# Patient Record
(demographics unavailable — no encounter records)

---

## 2017-01-06 NOTE — PDOC
VAGINAL DELIVERY


DATE


DATE: 17 


TIME: 14:24


:  4


Para:  4


EGA:  40


VAGINAL DELIVERY:  VTX


VACCUM ASSISTED:  No


PLACENTA:  Spontaneous


APGAR


8/9


SEX:  Male


WEIGHT


Weight [3455 gm ]


Nuchal Cord:  Yes, Times 2


Amniotic Fluid:  Clear


PAIN:  Natural


EPISIOTOMY:  No


EXTENSION:  Yes (2nd degree midline laceration)


REPAIRED WITH


2-0 vicryl


EBL


300 ml


COMPLICATIONS


none


CONDITION


pt. stable


Signs of Intrauterine Infectio:  None


Shoulder Dystocia:  No


Problems:  








JESSE LAUGHLIN Jr, MD 2017 14:25

## 2017-01-06 NOTE — PDOC1
OB - History


Hx of Present Pregnancy


Prenatal Care:  Good Care


Ultrasounds:  Normal mid trimester US


Obstetrical Complications:  None


Medical Complications:  None





Past Family/Social History


*


Past Medical, Surgical, Family and Obstetric Histories reviewed from prenatal 

chart.


Rubella:  Immune


RPR/VDRL:  Negative


GBS Status:  Negative


HBsAG:  Negative





OB - Chief Complaint & HPI


Date of Admission:


Date of Admission:  2017 at 09:23


Chief Complaint/History


:  4


Para:  3


EGA:  38


Reason for admission:  active labor


Admission Nurse Assessment Rev:  Yes


Problems:  





OB - Admission Exam


Physical Exam


Vitals:





 VS - Last 72 Hours, by Label








  Date Time  Temp Pulse Resp B/P Pulse Ox O2 Delivery O2 Flow Rate FiO2


 


17 11:50   22   Room Air  


 


17 10:24 97.9 98 20 124/67    





 97.9       








HEENT:  Normal


Heart:  Regular Rate


Lungs:  Clear


Abdomen:  Gravid, Non tender, Soft


Extremities:  Edema


Reflexes:  Normal


Cervical Dilatation:  5cm


Effacement:  75%


Station:  -2


Membranes:  Intact


Fetal Heart Rate:  Normal


Accelerations:  Accelerations Present


Decelerations:  No decelerations


Contractions on Admission:  < 5 Minutes Apart


Intensity:  Moderate


Text


A: 38 wks IUP


    Active labor


P: Admit for labor management.








JESSE LAUGHLIN Jr, MD 2017 13:38

## 2017-01-07 NOTE — PDOC
OB Progress Note


Date of Service


17


Time of Evaluation


1230


Notes


Pt. feeling well.  No complaints.


Lab





Laboratory Tests








Test


  17


09:50 17


04:00


 


White Blood Count


  10.9x10^3/uL


(4.0-11.0) 10.8x10^3/uL


(4.0-11.0)


 


Red Blood Count


  4.51x10^6/uL


(3.50-5.40) 3.94x10^6/uL


(3.50-5.40)


 


Hemoglobin


  13.4g/dL


(12.0-15.5) 11.7g/dL


(12.0-15.5)


 


Hematocrit


  39.4%


(36.0-47.0) 35.4%


(36.0-47.0)


 


Mean Corpuscular Volume 87fL ()  90fL () 


 


Mean Corpuscular Hemoglobin 30pg (25-35)  30pg (25-35) 


 


Mean Corpuscular Hemoglobin


Concent 34g/dL (31-37) 


  33g/dL (31-37) 


 


 


Red Cell Distribution Width


  13.4%


(11.5-14.5) 13.2%


(11.5-14.5)


 


Platelet Count


  237x10^3/uL


(140-400) 202x10^3/uL


(140-400)


 


Neutrophils (%) (Auto)  70% (31-73) 


 


Lymphocytes (%) (Auto)  23% (24-48) 


 


Monocytes (%) (Auto)  6% (0-9) 


 


Eosinophils (%) (Auto)  0% (0-3) 


 


Basophils (%) (Auto)  0% (0-3) 


 


Neutrophils # (Auto)


  


  7.6x10^3uL


(1.8-7.7)


 


Lymphocytes # (Auto)


  


  2.5x10^3/uL


(1.0-4.8)


 


Monocytes # (Auto)


  


  0.7x10^3/uL


(0.0-1.1)


 


Eosinophils # (Auto)


  


  0.0x10^3/uL


(0.0-0.7)


 


Basophils # (Auto)


  


  0.0x10^3/uL


(0.0-0.2)








Laboratory Tests








Test


  17


04:00


 


White Blood Count


  10.8x10^3/uL


(4.0-11.0)


 


Red Blood Count


  3.94x10^6/uL


(3.50-5.40)


 


Hemoglobin


  11.7g/dL


(12.0-15.5)


 


Hematocrit


  35.4%


(36.0-47.0)


 


Mean Corpuscular Volume 90fL () 


 


Mean Corpuscular Hemoglobin 30pg (25-35) 


 


Mean Corpuscular Hemoglobin


Concent 33g/dL (31-37) 


 


 


Red Cell Distribution Width


  13.2%


(11.5-14.5)


 


Platelet Count


  202x10^3/uL


(140-400)


 


Neutrophils (%) (Auto) 70% (31-73) 


 


Lymphocytes (%) (Auto) 23% (24-48) 


 


Monocytes (%) (Auto) 6% (0-9) 


 


Eosinophils (%) (Auto) 0% (0-3) 


 


Basophils (%) (Auto) 0% (0-3) 


 


Neutrophils # (Auto)


  7.6x10^3uL


(1.8-7.7)


 


Lymphocytes # (Auto)


  2.5x10^3/uL


(1.0-4.8)


 


Monocytes # (Auto)


  0.7x10^3/uL


(0.0-1.1)


 


Eosinophils # (Auto)


  0.0x10^3/uL


(0.0-0.7)


 


Basophils # (Auto)


  0.0x10^3/uL


(0.0-0.2)








Medications





 Current Medications


Sodium Chloride 3 ml 3 ml QSHIFT  PRN IV AFTER MEDS AND BLOOD DRAWS;  Start  at 10:00


Lactated Ringer's (Iv Lactated Ringers) 1,000 ml @  125 mls/hr Q8H IV  Last 

administered on  11:14;  Start 17 at 09:58


Butorphanol Tartrate (Stadol) 2 mg PRN Q1HR  PRN IV Severe labor pain Last 

administered on  14:32;  Start 17 at 10:00


Fentanyl Citrate (Fentanyl 2ml Vial) 100 mcg PRN Q30MIN  PRN IV Severe pain 

Last administered on  11:50;  Start 17 at 10:00


Terbutaline Sulfate (Brethine) 0.25 mg 1X PRN  PRN SQ SEE COMMENTS;  Start  at 10:00;  Stop 17 at 09:59;  Status DC


Lidocaine HCl 30 ml 30 ml 1X PRN  PRN INJ SEE COMMENTS Last administered on  14:32;  Start 17 at 10:00;  Stop 17 at 09:59


Oxytocin/Sodium Chloride (Oxytocin Premix Infusion) 500 ml @ 0 mls/hr CONT PRN  

PRN IV Post delivery bleeding Last administered on  11:51;  Start  at 10:00


Ibuprofen (Motrin) 800 mg PRN Q6HRS  PRN PO PAIN Last administered on  

09:14;  Start 17 at 10:00


Sodium Chloride 10 ml 10 ml QSHIFT  PRN IV AFTER MEDS AND BLOOD DRAWS;  Start 17 at 14:30


Oxytocin/Sodium Chloride (Oxytocin Premix Infusion) 500 ml @  62.5 mls/hr CONT  

PRN IV SEE I/O RECORD;  Start 17 at 14:30;  Stop 17 at 22:29;  Status DC


Acetaminophen (Tylenol) 650 mg PRN Q6HRS  PRN PO MILD PAIN / TEMP;  Start  at 14:30


Ibuprofen (Motrin) 800 mg PRN Q8HRS  PRN PO INFLAMMATION/PAIN PREVENTION;  

Start 17 at 14:30


Docusate Sodium (Colace) 100 mg PRN BID  PRN PO CONSTIPATION Last administered 

on  09:14;  Start 17 at 14:30


Magnesium Hydroxide (Milk Of Magnesia) 2,400 mg PRN DAILY  PRN PO CONSTIPATION;

  Start 17 at 14:30


Al Hydroxide/Mg Hydroxide (Mylanta Plus Xs) 30 ml PRN Q4HRS  PRN PO HEARTBURN / 

GAS;  Start 17 at 14:30


Simethicone (Gas-X) 80 mg PRN AFTMEALHC  PRN PO GAS / BLOATING;  Start 17 

at 14:30


Diphenhydramine HCl (Benadryl) 25 mg PRN Q6HRS  PRN PO ITCHING;  Start 17 

at 14:30


Benzocaine (Americaine) 1 spray PRN QID  PRN TP TOPICAL PAIN Last administered 

on  16:49;  Start 17 at 14:30


Phenyleph/Shark Oil/Min Oil/Petrol (Preparation H) 1 casie PRN QID  PRN RC RECTAL 

PAIN;  Start 17 at 14:30


Hydrocortisone (Cortaid) 1 casie PRN QID  PRN TP PERINEAL PAIN;  Start 17 at 

14:30


Ferrous Sulfate (Feosol) 325 mg BIDWMEALS PO ;  Start 17 at 08:00;  Stop  at 08:00;  Status DC


Zolpidem Tartrate (Ambien) 5 mg PRN QHS  PRN PO INSOMNIA, MAY REPEAT X1;  Start 

17 at 14:30


Info (Do NOT chart on this placeholder) 1 ea 1X PRN  PRN MC SEE COMMENTS;  

Start 17 at 14:30


Info (Do NOT chart on this placeholder) 1 ea 1X PRN  PRN MC SEE COMMENTS;  

Start 17 at 14:30


Oxycodone/ Acetaminophen (Percocet 5/325) 2 tab PRN Q4HRS  PRN PO MODERATE PAIN

, SEVERE PAIN Last administered on  22:19;  Start 17 at 14:30


Butorphanol Tartrate (Stadol) 2 mg PRN Q4HRS  PRN IV PAIN;  Start 17 at 14:

30


Exam


Abd: soft, non tender, fundus firm


Assessment


PPD#1 s/p 


Plan of Care:  Continue current Tx, Mgmt








JESSE LAUGHLIN Jr, MD 2017 12:30

## 2017-01-08 NOTE — PDOC
OB Progress Note


Date of Service


17


Time of Evaluation


1255


Notes


PT. feeling well.  No complaints.


Lab





Laboratory Tests








Test


  17


04:00


 


White Blood Count


  10.8x10^3/uL


(4.0-11.0)


 


Red Blood Count


  3.94x10^6/uL


(3.50-5.40)


 


Hemoglobin


  11.7g/dL


(12.0-15.5)


 


Hematocrit


  35.4%


(36.0-47.0)


 


Mean Corpuscular Volume 90fL () 


 


Mean Corpuscular Hemoglobin 30pg (25-35) 


 


Mean Corpuscular Hemoglobin


Concent 33g/dL (31-37) 


 


 


Red Cell Distribution Width


  13.2%


(11.5-14.5)


 


Platelet Count


  202x10^3/uL


(140-400)


 


Neutrophils (%) (Auto) 70% (31-73) 


 


Lymphocytes (%) (Auto) 23% (24-48) 


 


Monocytes (%) (Auto) 6% (0-9) 


 


Eosinophils (%) (Auto) 0% (0-3) 


 


Basophils (%) (Auto) 0% (0-3) 


 


Neutrophils # (Auto)


  7.6x10^3uL


(1.8-7.7)


 


Lymphocytes # (Auto)


  2.5x10^3/uL


(1.0-4.8)


 


Monocytes # (Auto)


  0.7x10^3/uL


(0.0-1.1)


 


Eosinophils # (Auto)


  0.0x10^3/uL


(0.0-0.7)


 


Basophils # (Auto)


  0.0x10^3/uL


(0.0-0.2)








Medications





 Current Medications


Sodium Chloride 3 ml 3 ml QSHIFT  PRN IV AFTER MEDS AND BLOOD DRAWS;  Start  at 10:00


Lactated Ringer's (Iv Lactated Ringers) 1,000 ml @  125 mls/hr Q8H IV  Last 

administered on  11:14;  Start 17 at 09:58


Butorphanol Tartrate (Stadol) 2 mg PRN Q1HR  PRN IV Severe labor pain Last 

administered on  14:32;  Start 17 at 10:00


Fentanyl Citrate (Fentanyl 2ml Vial) 100 mcg PRN Q30MIN  PRN IV Severe pain 

Last administered on  11:50;  Start 17 at 10:00


Terbutaline Sulfate (Brethine) 0.25 mg 1X PRN  PRN SQ SEE COMMENTS;  Start  at 10:00;  Stop 17 at 09:59;  Status DC


Lidocaine HCl 30 ml 30 ml 1X PRN  PRN INJ SEE COMMENTS Last administered on  14:32;  Start 17 at 10:00;  Stop 17 at 09:59;  Status DC


Oxytocin/Sodium Chloride (Oxytocin Premix Infusion) 500 ml @ 0 mls/hr CONT PRN  

PRN IV Post delivery bleeding Last administered on  11:51;  Start  at 10:00


Ibuprofen (Motrin) 800 mg PRN Q6HRS  PRN PO PAIN Last administered on  

06:28;  Start 17 at 10:00


Sodium Chloride 10 ml 10 ml QSHIFT  PRN IV AFTER MEDS AND BLOOD DRAWS;  Start 17 at 14:30


Oxytocin/Sodium Chloride (Oxytocin Premix Infusion) 500 ml @  62.5 mls/hr CONT  

PRN IV SEE I/O RECORD;  Start 17 at 14:30;  Stop 17 at 22:29;  Status DC


Acetaminophen (Tylenol) 650 mg PRN Q6HRS  PRN PO MILD PAIN / TEMP;  Start  at 14:30


Ibuprofen (Motrin) 800 mg PRN Q8HRS  PRN PO INFLAMMATION/PAIN PREVENTION;  

Start 17 at 14:30


Docusate Sodium (Colace) 100 mg PRN BID  PRN PO CONSTIPATION Last administered 

on  09:14;  Start 17 at 14:30


Magnesium Hydroxide (Milk Of Magnesia) 2,400 mg PRN DAILY  PRN PO CONSTIPATION;

  Start 17 at 14:30


Al Hydroxide/Mg Hydroxide (Mylanta Plus Xs) 30 ml PRN Q4HRS  PRN PO HEARTBURN / 

GAS;  Start 17 at 14:30


Simethicone (Gas-X) 80 mg PRN AFTMEALHC  PRN PO GAS / BLOATING;  Start 17 

at 14:30


Diphenhydramine HCl (Benadryl) 25 mg PRN Q6HRS  PRN PO ITCHING;  Start 17 

at 14:30


Benzocaine (Americaine) 1 spray PRN QID  PRN TP TOPICAL PAIN Last administered 

on  16:49;  Start 17 at 14:30


Phenyleph/Shark Oil/Min Oil/Petrol (Preparation H) 1 casie PRN QID  PRN RC RECTAL 

PAIN;  Start 17 at 14:30


Hydrocortisone (Cortaid) 1 casie PRN QID  PRN TP PERINEAL PAIN;  Start 17 at 

14:30


Ferrous Sulfate (Feosol) 325 mg BIDWMEALS PO ;  Start 17 at 08:00;  Stop  at 08:00;  Status DC


Zolpidem Tartrate (Ambien) 5 mg PRN QHS  PRN PO INSOMNIA, MAY REPEAT X1;  Start 

17 at 14:30


Info (Do NOT chart on this placeholder) 1 ea 1X PRN  PRN MC SEE COMMENTS;  

Start 17 at 14:30


Info (Do NOT chart on this placeholder) 1 ea 1X PRN  PRN MC SEE COMMENTS;  

Start 17 at 14:30


Oxycodone/ Acetaminophen (Percocet 5/325) 2 tab PRN Q4HRS  PRN PO MODERATE PAIN

, SEVERE PAIN Last administered on  00:12;  Start 17 at 14:30


Butorphanol Tartrate (Stadol) 2 mg PRN Q4HRS  PRN IV PAIN;  Start 17 at 14:

30


Exam


Abd: soft, non tender, fundus firm


Assessment


PPD#2 s/p 


Plan of Care:  See new orders (D/ c home.)








JESSE LAUGHLIN Jr, MD 2017 12:58

## 2017-01-08 NOTE — DISCH
DISCHARGE INSTRUCTIONS


Condition on Discharge


Condition on Discharge:  Stable





Activity After Discharge


Activity Instructions for Disc:  Activity as tolerated


Lifting Instructions after Dis:  No heavy lifting


Driving Instructions after Dis:  Do not drive today





Diet after Discharge


Diet after Discharge:  Regular





Contacting the [facility] after DC


Call your doctor for:  Concerns you may have





Follow-Up


Follow up with:  Deven in 6 weeks.








JESSE LAUGHLIN Jr, MD Jan 8, 2017 12:59